# Patient Record
Sex: FEMALE | URBAN - METROPOLITAN AREA
[De-identification: names, ages, dates, MRNs, and addresses within clinical notes are randomized per-mention and may not be internally consistent; named-entity substitution may affect disease eponyms.]

---

## 2017-12-14 ENCOUNTER — EMERGENCY (EMERGENCY)
Facility: HOSPITAL | Age: 27
LOS: 0 days | Discharge: HOME | End: 2017-12-14

## 2017-12-14 ENCOUNTER — APPOINTMENT (OUTPATIENT)
Dept: BREAST CENTER | Facility: CLINIC | Age: 27
End: 2017-12-14
Payer: OTHER GOVERNMENT

## 2017-12-14 VITALS
HEART RATE: 110 BPM | WEIGHT: 114 LBS | OXYGEN SATURATION: 96 % | BODY MASS INDEX: 20.98 KG/M2 | HEIGHT: 62 IN | SYSTOLIC BLOOD PRESSURE: 100 MMHG | DIASTOLIC BLOOD PRESSURE: 70 MMHG

## 2017-12-14 DIAGNOSIS — Z80.3 FAMILY HISTORY OF MALIGNANT NEOPLASM OF BREAST: ICD-10-CM

## 2017-12-14 DIAGNOSIS — N64.4 MASTODYNIA: ICD-10-CM

## 2017-12-14 DIAGNOSIS — N61.0 MASTITIS WITHOUT ABSCESS: ICD-10-CM

## 2017-12-14 PROBLEM — Z00.00 ENCOUNTER FOR PREVENTIVE HEALTH EXAMINATION: Status: ACTIVE | Noted: 2017-12-14

## 2017-12-14 PROCEDURE — 99203 OFFICE O/P NEW LOW 30 MIN: CPT

## 2017-12-14 RX ORDER — CLINDAMYCIN HYDROCHLORIDE 300 MG/1
300 CAPSULE ORAL EVERY 6 HOURS
Qty: 28 | Refills: 0 | Status: ACTIVE | COMMUNITY
Start: 2017-12-14 | End: 1900-01-01

## 2017-12-14 RX ORDER — ZINC OXIDE 13 %
CREAM (GRAM) TOPICAL
Qty: 1 | Refills: 0 | Status: ACTIVE | COMMUNITY
Start: 2017-12-14 | End: 1900-01-01

## 2017-12-26 ENCOUNTER — OUTPATIENT (OUTPATIENT)
Dept: OUTPATIENT SERVICES | Facility: HOSPITAL | Age: 27
LOS: 1 days | Discharge: HOME | End: 2017-12-26

## 2017-12-26 DIAGNOSIS — N63.20 UNSPECIFIED LUMP IN THE LEFT BREAST, UNSPECIFIED QUADRANT: ICD-10-CM

## 2017-12-27 ENCOUNTER — APPOINTMENT (OUTPATIENT)
Dept: BREAST CENTER | Facility: CLINIC | Age: 27
End: 2017-12-27
Payer: OTHER GOVERNMENT

## 2017-12-27 VITALS
HEART RATE: 103 BPM | BODY MASS INDEX: 20.98 KG/M2 | DIASTOLIC BLOOD PRESSURE: 70 MMHG | OXYGEN SATURATION: 99 % | HEIGHT: 62 IN | SYSTOLIC BLOOD PRESSURE: 110 MMHG | WEIGHT: 114 LBS

## 2017-12-27 PROCEDURE — 99213 OFFICE O/P EST LOW 20 MIN: CPT

## 2018-06-08 ENCOUNTER — APPOINTMENT (OUTPATIENT)
Dept: BREAST CENTER | Facility: CLINIC | Age: 28
End: 2018-06-08
Payer: COMMERCIAL

## 2018-06-08 VITALS
HEART RATE: 113 BPM | SYSTOLIC BLOOD PRESSURE: 112 MMHG | BODY MASS INDEX: 20.98 KG/M2 | DIASTOLIC BLOOD PRESSURE: 70 MMHG | HEIGHT: 62 IN | WEIGHT: 114 LBS | OXYGEN SATURATION: 99 %

## 2018-06-08 DIAGNOSIS — N60.41 MAMMARY DUCT ECTASIA OF RIGHT BREAST: ICD-10-CM

## 2018-06-08 PROCEDURE — 99213 OFFICE O/P EST LOW 20 MIN: CPT

## 2018-06-08 RX ORDER — CLINDAMYCIN HYDROCHLORIDE 300 MG/1
300 CAPSULE ORAL EVERY 6 HOURS
Qty: 28 | Refills: 0 | Status: ACTIVE | COMMUNITY
Start: 2018-06-08 | End: 1900-01-01

## 2018-06-08 RX ORDER — ZINC OXIDE 13 %
CREAM (GRAM) TOPICAL
Qty: 1 | Refills: 0 | Status: ACTIVE | COMMUNITY
Start: 2018-06-08 | End: 1900-01-01

## 2018-06-18 ENCOUNTER — APPOINTMENT (OUTPATIENT)
Dept: BREAST CENTER | Facility: CLINIC | Age: 28
End: 2018-06-18

## 2021-03-18 ENCOUNTER — HOSPITAL ENCOUNTER (EMERGENCY)
Age: 31
Discharge: HOME | End: 2021-03-18
Attending: EMERGENCY MEDICINE

## 2021-03-18 VITALS
SYSTOLIC BLOOD PRESSURE: 148 MMHG | BODY MASS INDEX: 22.08 KG/M2 | TEMPERATURE: 98.5 F | OXYGEN SATURATION: 100 % | HEIGHT: 62 IN | DIASTOLIC BLOOD PRESSURE: 104 MMHG | WEIGHT: 120 LBS | HEART RATE: 76 BPM | RESPIRATION RATE: 16 BRPM

## 2021-03-18 DIAGNOSIS — S00.83XA CONTUSION OF FOREHEAD, INITIAL ENCOUNTER: Primary | ICD-10-CM

## 2021-03-18 PROCEDURE — 99284 EMERGENCY DEPT VISIT MOD MDM: CPT

## 2021-03-18 NOTE — ED PROVIDER NOTES
HPI patient says 5 days ago she hit her right forehead on her car door she was transiting out of the car. She says she felt dazed and lightheaded after the incident but denies loss of consciousness. She says it did not knock her off her feet. She notes the next day that she had a black eye over the right eye and a contusion of the right eyebrow. Since that time she complains of some aching and soreness there. She denies any nausea vomiting. She denies any vision changes. She has not taken any pain medication for this. She presents today for evaluation because she is \"concerned about a concussion\". No other complaints given at this time. History reviewed. No pertinent past medical history. No past surgical history on file.       Family History:   Problem Relation Age of Onset    Cancer Paternal Grandmother        Social History     Socioeconomic History    Marital status: SINGLE     Spouse name: Not on file    Number of children: Not on file    Years of education: Not on file    Highest education level: Not on file   Occupational History    Not on file   Social Needs    Financial resource strain: Not on file    Food insecurity     Worry: Not on file     Inability: Not on file    Transportation needs     Medical: Not on file     Non-medical: Not on file   Tobacco Use    Smoking status: Never Smoker   Substance and Sexual Activity    Alcohol use: Yes     Comment: 2/week    Drug use: No    Sexual activity: Not Currently     Partners: Male     Birth control/protection: Condom   Lifestyle    Physical activity     Days per week: Not on file     Minutes per session: Not on file    Stress: Not on file   Relationships    Social connections     Talks on phone: Not on file     Gets together: Not on file     Attends [employer]ism service: Not on file     Active member of club or organization: Not on file     Attends meetings of clubs or organizations: Not on file     Relationship status: Not on file   Tim Raphael Intimate partner violence     Fear of current or ex partner: Not on file     Emotionally abused: Not on file     Physically abused: Not on file     Forced sexual activity: Not on file   Other Topics Concern    Not on file   Social History Narrative    Not on file         ALLERGIES: Patient has no known allergies. Review of Systems   Constitutional: Negative. HENT: Negative. Eyes: Negative. Respiratory: Negative. Cardiovascular: Negative. Gastrointestinal: Negative. Genitourinary: Negative. Musculoskeletal: Negative. Neurological: Positive for dizziness. Psychiatric/Behavioral: Negative. Vitals:    03/18/21 1045   BP: (!) 148/104   Pulse: 76   Resp: 16   Temp: 98.5 °F (36.9 °C)   SpO2: 100%   Weight: 54.4 kg (120 lb)   Height: 5' 2\" (1.575 m)            Physical Exam  Vitals signs and nursing note reviewed. Constitutional:       Appearance: She is well-developed. HENT:      Head: Normocephalic and atraumatic. Nose: Nose normal.      Mouth/Throat:      Mouth: Mucous membranes are moist.   Eyes:      Conjunctiva/sclera: Conjunctivae normal.      Pupils: Pupils are equal, round, and reactive to light. Neck:      Musculoskeletal: Normal range of motion and neck supple. Cardiovascular:      Rate and Rhythm: Normal rate and regular rhythm. Pulmonary:      Effort: Pulmonary effort is normal.      Breath sounds: Normal breath sounds. Abdominal:      Palpations: Abdomen is soft. Musculoskeletal: Normal range of motion. Skin:     General: Skin is warm and dry. Neurological:      General: No focal deficit present. Mental Status: She is alert and oriented to person, place, and time. Cranial Nerves: No cranial nerve deficit. Sensory: No sensory deficit. Motor: No weakness.       Coordination: Coordination normal.      Gait: Gait normal.      Deep Tendon Reflexes: Reflexes normal.   Psychiatric:         Mood and Affect: Mood normal. MDM  Number of Diagnoses or Management Options  Diagnosis management comments: I sat down and answer the patient's questions and provided reassurance. After the physical exam and discussion with the physician patient said she felt much better and felt reassured. She understands and agrees with the disposition and follow-up plan.   Kaci Sanchez MD 11:55 AM         Procedures

## 2021-03-18 NOTE — PROGRESS NOTES
conducted an initial consultation and Spiritual Assessment for The MATTHEW Patel, who is a 30 y.o.,female. Patient's Primary Language is: [place]. According to the patient's EMR Restorationist Affiliation is: [place]. The reason the Patient came to the hospital is: There are no active problems to display for this patient. The  provided the following Interventions:  Initiated a relationship of care and support. Explored issues of mayra, belief, spirituality and Voodoo/ritual needs while hospitalized. Listened empathically. Provided chaplaincy education. Provided information about Spiritual Care Services. Offered prayer and assurance of continued prayers on patient's behalf. Chart reviewed. The following outcomes where achieved:  Patient shared limited information about both their medical narrative and spiritual journey/beliefs.  confirmed Patient's Restorationist Affiliation. Patient processed feeling about current hospitalization. Patient expressed gratitude for 's visit. Assessment:  Patient does not have any Voodoo/cultural needs that will affect patient's preferences in health care. There are no spiritual or Voodoo issues which require intervention at this time. Plan:  Chaplains will continue to follow and will provide pastoral care on an as needed/requested basis.  recommends bedside caregivers page  on duty if patient shows signs of acute spiritual or emotional distress.     [address]   [phone]

## 2021-03-18 NOTE — ED TRIAGE NOTES
Patient states she hit her head on Sunday when she was getting into her car. She states since then she has been having headaches and feels like she is having trouble retaining information. She also c/o nausea and fatigue but states that may be related to her being on her cycle. She states concern for a concussion.